# Patient Record
Sex: FEMALE | Race: WHITE | Employment: UNEMPLOYED | URBAN - METROPOLITAN AREA
[De-identification: names, ages, dates, MRNs, and addresses within clinical notes are randomized per-mention and may not be internally consistent; named-entity substitution may affect disease eponyms.]

---

## 2017-04-21 PROCEDURE — 36415 COLL VENOUS BLD VENIPUNCTURE: CPT | Performed by: INTERNAL MEDICINE

## 2017-04-21 PROCEDURE — 82607 VITAMIN B-12: CPT | Performed by: INTERNAL MEDICINE

## 2017-04-24 PROBLEM — M54.50 BILATERAL LOW BACK PAIN WITHOUT SCIATICA, UNSPECIFIED CHRONICITY: Status: ACTIVE | Noted: 2017-04-24

## 2018-01-11 PROCEDURE — 88175 CYTOPATH C/V AUTO FLUID REDO: CPT | Performed by: OBSTETRICS & GYNECOLOGY

## 2018-02-22 PROBLEM — M54.50 BILATERAL LOW BACK PAIN WITHOUT SCIATICA, UNSPECIFIED CHRONICITY: Status: RESOLVED | Noted: 2017-04-24 | Resolved: 2018-02-22

## 2018-02-23 PROCEDURE — 82607 VITAMIN B-12: CPT | Performed by: INTERNAL MEDICINE

## 2019-09-11 PROBLEM — R92.2 DENSE BREAST TISSUE: Status: ACTIVE | Noted: 2018-08-01

## 2021-08-31 ENCOUNTER — TELEPHONE (OUTPATIENT)
Dept: ORTHOPEDICS CLINIC | Facility: CLINIC | Age: 61
End: 2021-08-31

## 2021-08-31 NOTE — TELEPHONE ENCOUNTER
Patient's  dropped off CD of [facility] preformed today at 8:31am, Discharge Report and Xray report from [place]. They are hoping to get an appointment ASAP for a \"mildly displaced fracture involving the 3rd metatarsal shaft. \"

## 2021-08-31 NOTE — TELEPHONE ENCOUNTER
Please advise if able to see patient sooner  -Last Wednesday patient noticed pain in her foot, Thursday she noticed swelling  -No injury or trauma  -Seen in Urgent Care this morning ([facility] in [address] [place] [address]) pt has a CD  - Patient was advised to see a podiatrist

## 2021-08-31 NOTE — TELEPHONE ENCOUNTER
Patient scheduled.    Future Appointments   Date Time Provider Regina Carlson   9/2/2021  9:15 AM Adi Barker., DPM [facility]

## 2021-09-02 ENCOUNTER — OFFICE VISIT (OUTPATIENT)
Dept: ORTHOPEDICS CLINIC | Facility: CLINIC | Age: 61
End: 2021-09-02
Payer: COMMERCIAL

## 2021-09-02 VITALS — WEIGHT: 140 LBS | HEIGHT: 67 IN | BODY MASS INDEX: 21.97 KG/M2

## 2021-09-02 DIAGNOSIS — M80.80XA OTHER OSTEOPOROSIS WITH CURRENT PATHOLOGICAL FRACTURE, INITIAL ENCOUNTER: ICD-10-CM

## 2021-09-02 DIAGNOSIS — S92.334A CLOSED NONDISPLACED FRACTURE OF THIRD METATARSAL BONE OF RIGHT FOOT, INITIAL ENCOUNTER: Primary | ICD-10-CM

## 2021-09-02 PROCEDURE — 3008F BODY MASS INDEX DOCD: CPT | Performed by: PODIATRIST

## 2021-09-02 PROCEDURE — 99203 OFFICE O/P NEW LOW 30 MIN: CPT | Performed by: PODIATRIST

## 2021-09-02 PROCEDURE — 28470 CLTX METATARSAL FX WO MNP EA: CPT | Performed by: PODIATRIST

## 2021-09-02 NOTE — PROGRESS NOTES
[facility] New Patient Note    CC: Patient presents with:   Foot Pain: right foot pain/no injury had xrays at [place]      HPI: The patient is a 62year old female who presents today with complaints of 1 week of right foot pain and swelling deformity of toes  Neutral arch upon stance, some flattening of right arch    Imaging:  Xray from [facility] - transvers mildly displaced midshaft 3rd metatarsal fracture right foot. personally viewed, independently interpreted and radiology report read.       Meron

## 2021-09-30 ENCOUNTER — OFFICE VISIT (OUTPATIENT)
Dept: ORTHOPEDICS CLINIC | Facility: CLINIC | Age: 61
End: 2021-09-30
Payer: COMMERCIAL

## 2021-09-30 ENCOUNTER — HOSPITAL ENCOUNTER (OUTPATIENT)
Dept: GENERAL RADIOLOGY | Age: 61
Discharge: HOME | End: 2021-09-30
Attending: PODIATRIST
Payer: COMMERCIAL

## 2021-09-30 VITALS — BODY MASS INDEX: 21.97 KG/M2 | WEIGHT: 140 LBS | HEIGHT: 67 IN

## 2021-09-30 DIAGNOSIS — S92.334A CLOSED NONDISPLACED FRACTURE OF THIRD METATARSAL BONE OF RIGHT FOOT, INITIAL ENCOUNTER: ICD-10-CM

## 2021-09-30 DIAGNOSIS — M85.871 OSTEOPENIA OF BOTH FEET: ICD-10-CM

## 2021-09-30 DIAGNOSIS — S92.334D CLOSED NONDISPLACED FRACTURE OF THIRD METATARSAL BONE OF RIGHT FOOT WITH ROUTINE HEALING, SUBSEQUENT ENCOUNTER: Primary | ICD-10-CM

## 2021-09-30 DIAGNOSIS — M85.872 OSTEOPENIA OF BOTH FEET: ICD-10-CM

## 2021-09-30 PROCEDURE — 73630 X-RAY EXAM OF FOOT: CPT | Performed by: PODIATRIST

## 2021-09-30 PROCEDURE — 99024 POSTOP FOLLOW-UP VISIT: CPT | Performed by: PODIATRIST

## 2021-09-30 PROCEDURE — 3008F BODY MASS INDEX DOCD: CPT | Performed by: PODIATRIST

## 2021-09-30 NOTE — PROGRESS NOTES
[facility] Progress Note    Subjective:   Caity De León is here for follow-up of the fracture third metatarsal right foot. She is doing pretty well having minimal pain using the boot.   5 weeks since she started to get pain in foot  Now has been in boot a

## 2021-10-21 ENCOUNTER — HOSPITAL ENCOUNTER (OUTPATIENT)
Dept: GENERAL RADIOLOGY | Age: 61
Discharge: HOME | End: 2021-10-21
Attending: PODIATRIST
Payer: COMMERCIAL

## 2021-10-21 ENCOUNTER — OFFICE VISIT (OUTPATIENT)
Dept: ORTHOPEDICS CLINIC | Facility: CLINIC | Age: 61
End: 2021-10-21
Payer: COMMERCIAL

## 2021-10-21 VITALS — BODY MASS INDEX: 21.97 KG/M2 | HEIGHT: 67 IN | WEIGHT: 140 LBS

## 2021-10-21 DIAGNOSIS — M81.0 OSTEOPOROSIS, UNSPECIFIED OSTEOPOROSIS TYPE, UNSPECIFIED PATHOLOGICAL FRACTURE PRESENCE: ICD-10-CM

## 2021-10-21 DIAGNOSIS — S92.334D CLOSED NONDISPLACED FRACTURE OF THIRD METATARSAL BONE OF RIGHT FOOT WITH ROUTINE HEALING, SUBSEQUENT ENCOUNTER: ICD-10-CM

## 2021-10-21 DIAGNOSIS — S92.334D CLOSED NONDISPLACED FRACTURE OF THIRD METATARSAL BONE OF RIGHT FOOT WITH ROUTINE HEALING, SUBSEQUENT ENCOUNTER: Primary | ICD-10-CM

## 2021-10-21 PROCEDURE — 73630 X-RAY EXAM OF FOOT: CPT | Performed by: PODIATRIST

## 2021-10-21 PROCEDURE — 3008F BODY MASS INDEX DOCD: CPT | Performed by: PODIATRIST

## 2021-10-21 PROCEDURE — 99024 POSTOP FOLLOW-UP VISIT: CPT | Performed by: PODIATRIST

## 2021-10-21 NOTE — PROGRESS NOTES
[facility] Progress Note    Subjective:   Shayy Garcia returns for follow-up of a third metatarsal stress fracture of the right foot. It has been about  2 months post injury to foot  She is now in the postop shoe for about 1 week.   Still having little d

## 2021-11-11 ENCOUNTER — HOSPITAL ENCOUNTER (OUTPATIENT)
Dept: GENERAL RADIOLOGY | Age: 61
Discharge: HOME | End: 2021-11-11
Attending: PODIATRIST
Payer: COMMERCIAL

## 2021-11-11 ENCOUNTER — OFFICE VISIT (OUTPATIENT)
Dept: ORTHOPEDICS CLINIC | Facility: CLINIC | Age: 61
End: 2021-11-11
Payer: COMMERCIAL

## 2021-11-11 VITALS — WEIGHT: 140 LBS | BODY MASS INDEX: 21.97 KG/M2 | HEIGHT: 67 IN

## 2021-11-11 DIAGNOSIS — M85.872 OSTEOPENIA OF BOTH FEET: ICD-10-CM

## 2021-11-11 DIAGNOSIS — S92.336D: Primary | ICD-10-CM

## 2021-11-11 DIAGNOSIS — M85.871 OSTEOPENIA OF BOTH FEET: ICD-10-CM

## 2021-11-11 DIAGNOSIS — S92.334D CLOSED NONDISPLACED FRACTURE OF THIRD METATARSAL BONE OF RIGHT FOOT WITH ROUTINE HEALING, SUBSEQUENT ENCOUNTER: ICD-10-CM

## 2021-11-11 PROCEDURE — 73630 X-RAY EXAM OF FOOT: CPT | Performed by: PODIATRIST

## 2021-11-11 PROCEDURE — 99024 POSTOP FOLLOW-UP VISIT: CPT | Performed by: PODIATRIST

## 2021-11-11 PROCEDURE — 3008F BODY MASS INDEX DOCD: CPT | Performed by: PODIATRIST

## 2021-11-11 NOTE — PROGRESS NOTES
[facility] Progress Note    Subjective: Manda is here for follow-up today of her third metatarsal fracture right foot. She is doing much better. She is in post op shoe. Not complaining of any pain.   She did go on a trip and wore [employer]ek Ready for pr